# Patient Record
Sex: MALE | URBAN - METROPOLITAN AREA
[De-identification: names, ages, dates, MRNs, and addresses within clinical notes are randomized per-mention and may not be internally consistent; named-entity substitution may affect disease eponyms.]

---

## 2023-03-21 ENCOUNTER — HOSPITAL ENCOUNTER (EMERGENCY)
Facility: MEDICAL CENTER | Age: 43
End: 2023-03-21
Attending: EMERGENCY MEDICINE

## 2023-03-21 VITALS
TEMPERATURE: 98 F | RESPIRATION RATE: 16 BRPM | DIASTOLIC BLOOD PRESSURE: 128 MMHG | HEART RATE: 128 BPM | OXYGEN SATURATION: 93 % | SYSTOLIC BLOOD PRESSURE: 169 MMHG

## 2023-03-21 PROCEDURE — 302449 STATCHG TRIAGE ONLY (STATISTIC)

## 2023-03-21 NOTE — ED TRIAGE NOTES
Chief Complaint   Patient presents with    Hypertension     Pt brought in by law enforcement because he would not be accepted at the detention due to elevated DBP >100.  Pt normally takes BP meds, but hasn't taken his BP meds in the last 3 weeks since he hasn't checked his P.O. box      Pt BIB law enforcement for the above complaint. Pt denies headache, CP, and all other adverse symptoms. Pt has not taken his BP meds in 3 weeks.  (+)ETOH 6-8 drinks    BP (!) 183/132   Pulse (!) 137   Temp 36.7 °C (98 °F) (Temporal)   Resp 16   SpO2 93%

## 2023-03-21 NOTE — ED PROVIDER NOTES
ED Provider Note    4:20 AM notified by nursing staff, that the patient eloped from the ED, prior to being seen.

## 2023-03-21 NOTE — ED NOTES
Pt choosing to leave ER prior to physician contact at this time, AMA paperwork signed and in the chart. This RN at bedside to explain risks of leaving prior to seeing a physician, Pt demonstrates verbal understanding risks. Pt verbalized seeing PCP in the morning and restarting his BP meds. Informed pt to follow up about ER visit and returning to ER for worsening condition. Pt independent and ambulatory with steady gait, and AOX4.